# Patient Record
Sex: FEMALE | Race: WHITE | NOT HISPANIC OR LATINO | Employment: FULL TIME | URBAN - METROPOLITAN AREA
[De-identification: names, ages, dates, MRNs, and addresses within clinical notes are randomized per-mention and may not be internally consistent; named-entity substitution may affect disease eponyms.]

---

## 2024-01-18 ENCOUNTER — TELEPHONE (OUTPATIENT)
Dept: PRIMARY CARE | Facility: CLINIC | Age: 64
End: 2024-01-18

## 2024-01-18 NOTE — TELEPHONE ENCOUNTER
Received call from [employer] stating they faxed an order in October and November for patients CPAP supplies. Unable to locate this fax/paperwork. Advised caller to refax paperwork. Awaiting paperwork to be resent.

## 2024-02-07 NOTE — TELEPHONE ENCOUNTER
Spoke with staff member from [employer] [facility]. Requested a blank form be faxed to our office. Awaiting fax

## 2024-02-16 ENCOUNTER — LAB (OUTPATIENT)
Dept: LAB | Facility: LAB | Age: 64
End: 2024-02-16
Payer: COMMERCIAL

## 2024-02-16 DIAGNOSIS — E55.9 VITAMIN D DEFICIENCY, UNSPECIFIED: ICD-10-CM

## 2024-02-16 DIAGNOSIS — E11.9 TYPE 2 DIABETES MELLITUS WITHOUT COMPLICATIONS (MULTI): Primary | ICD-10-CM

## 2024-02-16 DIAGNOSIS — E78.5 HYPERLIPIDEMIA, UNSPECIFIED: ICD-10-CM

## 2024-02-16 LAB
25(OH)D3 SERPL-MCNC: 34 NG/ML (ref 31–100)
ALBUMIN SERPL-MCNC: 4.2 G/DL (ref 3.5–5)
ALP BLD-CCNC: 60 U/L (ref 35–125)
ALT SERPL-CCNC: 17 U/L (ref 5–40)
ANION GAP SERPL CALC-SCNC: 16 MMOL/L
AST SERPL-CCNC: 17 U/L (ref 5–40)
BILIRUB SERPL-MCNC: 0.4 MG/DL (ref 0.1–1.2)
BUN SERPL-MCNC: 18 MG/DL (ref 8–25)
CALCIUM SERPL-MCNC: 9.2 MG/DL (ref 8.5–10.4)
CHLORIDE SERPL-SCNC: 104 MMOL/L (ref 97–107)
CHOLEST SERPL-MCNC: 207 MG/DL (ref 133–200)
CHOLEST/HDLC SERPL: 3.4 {RATIO}
CO2 SERPL-SCNC: 24 MMOL/L (ref 24–31)
CREAT SERPL-MCNC: 0.6 MG/DL (ref 0.4–1.6)
CREAT UR-MCNC: 163.2 MG/DL
EGFRCR SERPLBLD CKD-EPI 2021: >90 ML/MIN/1.73M*2
EST. AVERAGE GLUCOSE BLD GHB EST-MCNC: 131 MG/DL
GLUCOSE SERPL-MCNC: 122 MG/DL (ref 65–99)
HBA1C MFR BLD: 6.2 %
HDLC SERPL-MCNC: 61 MG/DL
LDLC SERPL CALC-MCNC: 132 MG/DL (ref 65–130)
MICROALBUMIN UR-MCNC: <12 MG/L (ref 0–23)
MICROALBUMIN/CREAT UR: NORMAL MG/G{CREAT}
POTASSIUM SERPL-SCNC: 4.3 MMOL/L (ref 3.4–5.1)
PROT SERPL-MCNC: 7.1 G/DL (ref 5.9–7.9)
SODIUM SERPL-SCNC: 144 MMOL/L (ref 133–145)
TRIGL SERPL-MCNC: 70 MG/DL (ref 40–150)
VIT B12 SERPL-MCNC: 271 PG/ML (ref 211–946)

## 2024-02-16 PROCEDURE — 82043 UR ALBUMIN QUANTITATIVE: CPT

## 2024-02-16 PROCEDURE — 82607 VITAMIN B-12: CPT

## 2024-02-16 PROCEDURE — 36415 COLL VENOUS BLD VENIPUNCTURE: CPT

## 2024-02-16 PROCEDURE — 80053 COMPREHEN METABOLIC PANEL: CPT

## 2024-02-16 PROCEDURE — 80061 LIPID PANEL: CPT

## 2024-02-16 PROCEDURE — 82570 ASSAY OF URINE CREATININE: CPT

## 2024-02-16 PROCEDURE — 82306 VITAMIN D 25 HYDROXY: CPT

## 2024-02-16 PROCEDURE — 83036 HEMOGLOBIN GLYCOSYLATED A1C: CPT

## 2024-03-25 ENCOUNTER — OFFICE VISIT (OUTPATIENT)
Dept: PRIMARY CARE | Facility: CLINIC | Age: 64
End: 2024-03-25
Payer: COMMERCIAL

## 2024-03-25 VITALS
SYSTOLIC BLOOD PRESSURE: 134 MMHG | OXYGEN SATURATION: 96 % | HEIGHT: 63 IN | HEART RATE: 70 BPM | BODY MASS INDEX: 45.93 KG/M2 | TEMPERATURE: 97.3 F | WEIGHT: 259.2 LBS | DIASTOLIC BLOOD PRESSURE: 82 MMHG

## 2024-03-25 DIAGNOSIS — Z00.00 ANNUAL PHYSICAL EXAM: Primary | ICD-10-CM

## 2024-03-25 DIAGNOSIS — E11.9 CONTROLLED TYPE 2 DIABETES MELLITUS WITHOUT COMPLICATION, WITHOUT LONG-TERM CURRENT USE OF INSULIN (MULTI): ICD-10-CM

## 2024-03-25 DIAGNOSIS — E78.5 HYPERLIPIDEMIA, UNSPECIFIED HYPERLIPIDEMIA TYPE: ICD-10-CM

## 2024-03-25 DIAGNOSIS — I10 BENIGN HYPERTENSION: ICD-10-CM

## 2024-03-25 PROBLEM — Z90.710 HISTORY OF HYSTERECTOMY: Status: ACTIVE | Noted: 2024-03-25

## 2024-03-25 PROBLEM — K50.10: Status: ACTIVE | Noted: 2024-03-25

## 2024-03-25 PROBLEM — G47.33 OBSTRUCTIVE SLEEP APNEA: Status: ACTIVE | Noted: 2024-03-25

## 2024-03-25 PROBLEM — E55.9 VITAMIN D DEFICIENCY: Status: ACTIVE | Noted: 2024-03-25

## 2024-03-25 PROBLEM — Z85.42 HISTORY OF ENDOMETRIAL CANCER: Status: ACTIVE | Noted: 2024-03-25

## 2024-03-25 PROCEDURE — 1160F RVW MEDS BY RX/DR IN RCRD: CPT | Performed by: FAMILY MEDICINE

## 2024-03-25 PROCEDURE — 3050F LDL-C >= 130 MG/DL: CPT | Performed by: FAMILY MEDICINE

## 2024-03-25 PROCEDURE — 3044F HG A1C LEVEL LT 7.0%: CPT | Performed by: FAMILY MEDICINE

## 2024-03-25 PROCEDURE — 1159F MED LIST DOCD IN RCRD: CPT | Performed by: FAMILY MEDICINE

## 2024-03-25 PROCEDURE — 1036F TOBACCO NON-USER: CPT | Performed by: FAMILY MEDICINE

## 2024-03-25 PROCEDURE — 1158F ADVNC CARE PLAN TLK DOCD: CPT | Performed by: FAMILY MEDICINE

## 2024-03-25 PROCEDURE — 99397 PER PM REEVAL EST PAT 65+ YR: CPT | Performed by: FAMILY MEDICINE

## 2024-03-25 PROCEDURE — 3008F BODY MASS INDEX DOCD: CPT | Performed by: FAMILY MEDICINE

## 2024-03-25 PROCEDURE — 3079F DIAST BP 80-89 MM HG: CPT | Performed by: FAMILY MEDICINE

## 2024-03-25 PROCEDURE — 1123F ACP DISCUSS/DSCN MKR DOCD: CPT | Performed by: FAMILY MEDICINE

## 2024-03-25 PROCEDURE — 3062F POS MACROALBUMINURIA REV: CPT | Performed by: FAMILY MEDICINE

## 2024-03-25 PROCEDURE — 90677 PCV20 VACCINE IM: CPT | Performed by: FAMILY MEDICINE

## 2024-03-25 PROCEDURE — 3075F SYST BP GE 130 - 139MM HG: CPT | Performed by: FAMILY MEDICINE

## 2024-03-25 PROCEDURE — 1126F AMNT PAIN NOTED NONE PRSNT: CPT | Performed by: FAMILY MEDICINE

## 2024-03-25 RX ORDER — LISINOPRIL AND HYDROCHLOROTHIAZIDE 20; 25 MG/1; MG/1
1 TABLET ORAL EVERY 24 HOURS
COMMUNITY
Start: 2020-08-03 | End: 2024-04-02

## 2024-03-25 RX ORDER — ATORVASTATIN CALCIUM 20 MG/1
20 TABLET, FILM COATED ORAL DAILY
Qty: 90 TABLET | Refills: 3 | Status: SHIPPED | OUTPATIENT
Start: 2024-03-25 | End: 2025-03-25

## 2024-03-25 RX ORDER — CHOLECALCIFEROL (VITAMIN D3) 50 MCG
2000 TABLET ORAL EVERY 24 HOURS
COMMUNITY

## 2024-03-25 ASSESSMENT — PATIENT HEALTH QUESTIONNAIRE - PHQ9
SUM OF ALL RESPONSES TO PHQ9 QUESTIONS 1 AND 2: 0
2. FEELING DOWN, DEPRESSED OR HOPELESS: NOT AT ALL
1. LITTLE INTEREST OR PLEASURE IN DOING THINGS: NOT AT ALL

## 2024-03-25 ASSESSMENT — PAIN SCALES - GENERAL: PAINLEVEL: 0-NO PAIN

## 2024-03-25 NOTE — PROGRESS NOTES
History Of Present Illness  Sara Granado is a 63 y.o. female presenting for Annual Exam  .    HPI Last visit 9/13/2023 for diabetic follow-up.  Her A1c was 5.9.  Last colonoscopy 2017 with Dr. Vargas; then did cologuard in 3/2022 which is negative until 2025.  She is status post [facility] with BSO for uterine cancer in 2014, follows with Dr. Ho, who she intends to schedule with.   Diet controlled diabetes stable. She did not like side effects of metformin. Weight increased since last visit. No exercise. Diet unchanged.    Past Medical History  Patient Active Problem List    Diagnosis Date Noted    Benign hypertension 03/25/2024    Crohn's disease of large intestine (CMS/HCC) 03/25/2024    History of endometrial cancer 03/25/2024    History of hysterectomy 03/25/2024    Hyperlipidemia 03/25/2024    Obstructive sleep apnea 03/25/2024    Vitamin D deficiency 03/25/2024    Controlled type 2 diabetes mellitus without complication, without long-term current use of insulin (CMS/[facility]) 03/25/2024        Medications  Current Outpatient Medications on File Prior to Visit   Medication Sig    cholecalciferol (Vitamin D3) 50 MCG (2000 UT) tablet 1 tablet (2,000 Units) once every 24 hours.    lisinopriL-hydrochlorothiazide 20-25 mg tablet 1 tablet once every 24 hours.    NON FORMULARY once every 24 hours. OCCUVITE     No current facility-administered medications on file prior to visit.        Surgical History  She has a past surgical history that includes Hysterectomy (08/13/2014); Other surgical history (08/13/2014); Colectomy (08/13/2014); and BI stereotactic guided breast right localization and biopsy (Right, 12/17/2018).     Social History  She reports that she has never smoked. She has never used smokeless tobacco. She reports current alcohol use. She reports that she does not use drugs.    Family History  No family history on file.     Allergies  Codeine, Other, and Peach (prunus persica)    Immunizations  Immunization  "History   Administered Date(s) Administered    Moderna SARS-CoV-2 Vaccination 03/17/2021, 04/14/2021, 12/20/2021    Pneumococcal conjugate vaccine, 20-valent (PREVNAR 20) 03/25/2024    Tdap vaccine, age 7 year and older (BOOSTRIX, ADACEL) 06/18/2014    Zoster vaccine, recombinant, adult (SHINGRIX) 04/22/2022, 07/08/2022        ROS  Negative, except as discussed in HPI     Vitals  /82   Pulse 70   Temp 36.3 °C (97.3 °F)   Ht 1.588 m (5' 2.5\")   Wt 118 kg (259 lb 3.2 oz)   SpO2 96%   BMI 46.65 kg/m²      Physical Exam  Vitals and nursing note reviewed.   Constitutional:       Appearance: Normal appearance.   HENT:      Head: Normocephalic.      Right Ear: Tympanic membrane normal.      Left Ear: Tympanic membrane normal.      Nose: Nose normal.      Mouth/Throat:      Mouth: Mucous membranes are moist.   Eyes:      Extraocular Movements: Extraocular movements intact.      Conjunctiva/sclera: Conjunctivae normal.      Pupils: Pupils are equal, round, and reactive to light.   Cardiovascular:      Rate and Rhythm: Normal rate and regular rhythm.      Heart sounds: Normal heart sounds.   Pulmonary:      Effort: Pulmonary effort is normal. No respiratory distress.      Breath sounds: Normal breath sounds.   Abdominal:      General: Abdomen is flat.      Palpations: Abdomen is soft.      Tenderness: There is no abdominal tenderness.   Musculoskeletal:      Cervical back: Neck supple.   Lymphadenopathy:      Cervical: No cervical adenopathy.   Skin:     General: Skin is warm and dry.      Findings: No rash.   Neurological:      General: No focal deficit present.      Mental Status: She is alert. Mental status is at baseline.      Coordination: Coordination normal.      Gait: Gait normal.      Deep Tendon Reflexes: Reflexes normal.   Psychiatric:         Mood and Affect: Mood normal.         Behavior: Behavior normal.         Relevant Results  Lab Results   Component Value Date    WBC 6.5 02/04/2021    WBC 7.3 " 01/23/2020    HGB 13.2 02/04/2021    HGB 13.1 01/23/2020    HCT 41.8 02/04/2021    HCT 40.4 01/23/2020    MCV 95.9 02/04/2021    MCV 95.7 01/23/2020     02/04/2021     01/23/2020     Lab Results   Component Value Date     02/16/2024     03/03/2023    K 4.3 02/16/2024    K 4.4 03/03/2023     02/16/2024     03/03/2023    CO2 24 02/16/2024    CO2 24 03/03/2023    BUN 18 02/16/2024    BUN 18 03/03/2023    CREATININE 0.60 02/16/2024    CREATININE 0.6 03/03/2023    CALCIUM 9.2 02/16/2024    CALCIUM 9.4 03/03/2023    PROT 7.1 02/16/2024    PROT 7.2 03/03/2023    BILITOT 0.4 02/16/2024    BILITOT 0.4 03/03/2023    ALKPHOS 60 02/16/2024    ALKPHOS 59 03/03/2023    ALT 17 02/16/2024    ALT 18 03/03/2023    AST 17 02/16/2024    AST 17 03/03/2023    GLUCOSE 122 (H) 02/16/2024    GLUCOSE 127 (H) 03/03/2023     Lab Results   Component Value Date    HGBA1C 6.2 (H) 02/16/2024     Lab Results   Component Value Date    TSH 2.86 01/17/2018      Lab Results   Component Value Date    CHOL 207 (H) 02/16/2024    TRIG 70 02/16/2024    HDL 61.0 02/16/2024           Assessment/Plan   Sara was seen today for annual exam.  Diagnoses and all orders for this visit:  Annual physical exam (Primary)  Comments:  Will see OBGYN. UTD with  Benign hypertension  Comments:  controlled  Hyperlipidemia, unspecified hyperlipidemia type  Comments:  ASCVD 13.6%. Discussed statin and pt desires to start  Orders:  -     atorvastatin (Lipitor) 20 mg tablet; Take 1 tablet (20 mg) by mouth once daily.  Controlled type 2 diabetes mellitus without complication, without long-term current use of insulin (CMS/[facility])  Comments:  a1c 6.2%, diet controlled off metformin  Orders:  -     Follow Up In Primary Care - Established; Future  BMI 45.0-49.9, adult (CMS/[facility])  Comments:  discussed increasing exercise, dietary change for weight loss, and medications including GLP1 inhh  Other orders  -     Pneumococcal conjugate vaccine, 20-valent  (PREVNAR 20)       There are no discontinued medications.     Counseling:   Medication education:   -Education:  The patient is counseled regarding potential side-effects of any and all new medications  -Understanding:  Patient expressed understanding of information discussed today  -Adherence:  No barriers to adherence identified    Final treatment plan is a result of shared decision making with patient.         Navjot Gannon MD

## 2024-04-01 DIAGNOSIS — Z76.0 MEDICATION REFILL: ICD-10-CM

## 2024-04-02 RX ORDER — LISINOPRIL AND HYDROCHLOROTHIAZIDE 20; 25 MG/1; MG/1
1 TABLET ORAL DAILY
Qty: 90 TABLET | Refills: 3 | Status: SHIPPED | OUTPATIENT
Start: 2024-04-02

## 2024-09-24 ENCOUNTER — OFFICE VISIT (OUTPATIENT)
Dept: PRIMARY CARE | Facility: CLINIC | Age: 64
End: 2024-09-24
Payer: COMMERCIAL

## 2024-09-24 VITALS
DIASTOLIC BLOOD PRESSURE: 80 MMHG | BODY MASS INDEX: 45 KG/M2 | OXYGEN SATURATION: 96 % | TEMPERATURE: 96.9 F | HEART RATE: 78 BPM | HEIGHT: 63 IN | SYSTOLIC BLOOD PRESSURE: 118 MMHG | WEIGHT: 254 LBS

## 2024-09-24 DIAGNOSIS — N95.9 MENOPAUSAL DISORDER: ICD-10-CM

## 2024-09-24 DIAGNOSIS — E78.5 HYPERLIPIDEMIA, UNSPECIFIED HYPERLIPIDEMIA TYPE: ICD-10-CM

## 2024-09-24 DIAGNOSIS — E11.9 CONTROLLED TYPE 2 DIABETES MELLITUS WITHOUT COMPLICATION, WITHOUT LONG-TERM CURRENT USE OF INSULIN (MULTI): Primary | ICD-10-CM

## 2024-09-24 DIAGNOSIS — K50.118 CROHN'S DISEASE OF LARGE INTESTINE WITH OTHER COMPLICATION: ICD-10-CM

## 2024-09-24 DIAGNOSIS — E55.9 VITAMIN D DEFICIENCY: ICD-10-CM

## 2024-09-24 DIAGNOSIS — I10 BENIGN HYPERTENSION: ICD-10-CM

## 2024-09-24 LAB — POC HEMOGLOBIN A1C: 5.9 % (ref 4.2–6.5)

## 2024-09-24 PROCEDURE — 3062F POS MACROALBUMINURIA REV: CPT | Performed by: FAMILY MEDICINE

## 2024-09-24 PROCEDURE — 3050F LDL-C >= 130 MG/DL: CPT | Performed by: FAMILY MEDICINE

## 2024-09-24 PROCEDURE — 1126F AMNT PAIN NOTED NONE PRSNT: CPT | Performed by: FAMILY MEDICINE

## 2024-09-24 PROCEDURE — 1123F ACP DISCUSS/DSCN MKR DOCD: CPT | Performed by: FAMILY MEDICINE

## 2024-09-24 PROCEDURE — 1158F ADVNC CARE PLAN TLK DOCD: CPT | Performed by: FAMILY MEDICINE

## 2024-09-24 PROCEDURE — 3008F BODY MASS INDEX DOCD: CPT | Performed by: FAMILY MEDICINE

## 2024-09-24 PROCEDURE — 3044F HG A1C LEVEL LT 7.0%: CPT | Performed by: FAMILY MEDICINE

## 2024-09-24 PROCEDURE — 83036 HEMOGLOBIN GLYCOSYLATED A1C: CPT | Performed by: FAMILY MEDICINE

## 2024-09-24 PROCEDURE — 3074F SYST BP LT 130 MM HG: CPT | Performed by: FAMILY MEDICINE

## 2024-09-24 PROCEDURE — 1160F RVW MEDS BY RX/DR IN RCRD: CPT | Performed by: FAMILY MEDICINE

## 2024-09-24 PROCEDURE — 99214 OFFICE O/P EST MOD 30 MIN: CPT | Performed by: FAMILY MEDICINE

## 2024-09-24 PROCEDURE — 1159F MED LIST DOCD IN RCRD: CPT | Performed by: FAMILY MEDICINE

## 2024-09-24 PROCEDURE — 3079F DIAST BP 80-89 MM HG: CPT | Performed by: FAMILY MEDICINE

## 2024-09-24 PROCEDURE — 1036F TOBACCO NON-USER: CPT | Performed by: FAMILY MEDICINE

## 2024-09-24 ASSESSMENT — PATIENT HEALTH QUESTIONNAIRE - PHQ9
2. FEELING DOWN, DEPRESSED OR HOPELESS: NOT AT ALL
1. LITTLE INTEREST OR PLEASURE IN DOING THINGS: NOT AT ALL
SUM OF ALL RESPONSES TO PHQ9 QUESTIONS 1 AND 2: 0

## 2024-09-24 ASSESSMENT — PAIN SCALES - GENERAL: PAINLEVEL: 0-NO PAIN

## 2024-09-24 ASSESSMENT — LIFESTYLE VARIABLES
HOW OFTEN DO YOU HAVE SIX OR MORE DRINKS ON ONE OCCASION: NEVER
SKIP TO QUESTIONS 9-10: 1
HOW OFTEN DO YOU HAVE A DRINK CONTAINING ALCOHOL: 2-4 TIMES A MONTH
AUDIT-C TOTAL SCORE: 2
HOW MANY STANDARD DRINKS CONTAINING ALCOHOL DO YOU HAVE ON A TYPICAL DAY: 1 OR 2

## 2024-09-24 NOTE — PROGRESS NOTES
History Of Present Illness  Sara Granado is a 64 y.o. female presenting for Diabetes  .    She is a type II diabetic, without insulin use.  Has been diet controlled.  Last A1c was 6.2% 6 months ago.  Today she is down 5 pounds from her last visit, and A1c 5.9%.    She has essential hypertension which is controlled with lisinopril /hydrochlorothiazide.  Asymptomatic.  No dizziness, headaches, chest pain.    Hyperlipidemia -doing well on the statin.  No myalgias.    She has a history of Crohn's but hasn't been on medications for years.  She started noticing diarrhea and occasionally abdominal cramping.  She thought it was metformin but she has been off metformin for a while and now is still having it so she will see GI again.  Last colonoscopy was 2017 with Dr. Vargas.         Past Medical History  Patient Active Problem List    Diagnosis Date Noted    Benign hypertension 03/25/2024    Crohn's disease of large intestine (Multi) 03/25/2024    History of endometrial cancer 03/25/2024    History of hysterectomy 03/25/2024    Hyperlipidemia 03/25/2024    Obstructive sleep apnea 03/25/2024    Vitamin D deficiency 03/25/2024    Controlled type 2 diabetes mellitus without complication, without long-term current use of insulin (Multi) 03/25/2024        Medications  Current Outpatient Medications   Medication Sig Dispense Refill    atorvastatin (Lipitor) 20 mg tablet Take 1 tablet (20 mg) by mouth once daily. 90 tablet 3    cholecalciferol (Vitamin D3) 50 MCG (2000 UT) tablet 1 tablet (2,000 Units) once every 24 hours.      lisinopriL-hydrochlorothiazide 20-25 mg tablet TAKE 1 TABLET BY MOUTH ONCE DAILY 90 tablet 3    NON FORMULARY once every 24 hours. OCCUVITE       No current facility-administered medications for this visit.        Surgical History  She has a past surgical history that includes Hysterectomy (08/13/2014); Other surgical history (08/13/2014); Colectomy (08/13/2014); and BI stereotactic guided breast right  "localization and biopsy (Right, 12/17/2018).     Social History  She reports that she has never smoked. She has never used smokeless tobacco. She reports current alcohol use. She reports that she does not use drugs.    Family History  No family history on file.     Allergies  Codeine, Other, and Peach (prunus persica)    Immunizations  Immunization History   Administered Date(s) Administered    Moderna SARS-CoV-2 Vaccination 03/17/2021, 04/14/2021, 12/20/2021    Pneumococcal conjugate vaccine, 20-valent (PREVNAR 20) 03/25/2024    Tdap vaccine, age 7 year and older (BOOSTRIX, ADACEL) 06/18/2014    Zoster vaccine, recombinant, adult (SHINGRIX) 04/22/2022, 07/08/2022        ROS  Negative, except as discussed in HPI     Vitals  /80   Pulse 78   Temp 36.1 °C (96.9 °F)   Ht 1.588 m (5' 2.5\")   Wt 115 kg (254 lb)   SpO2 96%   BMI 45.72 kg/m²      Physical Exam  Vitals and nursing note reviewed.   Constitutional:       General: She is not in acute distress.     Appearance: Normal appearance.   Cardiovascular:      Rate and Rhythm: Normal rate and regular rhythm.      Heart sounds: Normal heart sounds.   Pulmonary:      Effort: No respiratory distress.      Breath sounds: Normal breath sounds.   Neurological:      General: No focal deficit present.      Mental Status: She is alert. Mental status is at baseline.         Relevant Results  Lab Results   Component Value Date    WBC 6.5 02/04/2021    WBC 7.3 01/23/2020    HGB 13.2 02/04/2021    HGB 13.1 01/23/2020    HCT 41.8 02/04/2021    HCT 40.4 01/23/2020    MCV 95.9 02/04/2021    MCV 95.7 01/23/2020     02/04/2021     01/23/2020     Lab Results   Component Value Date     02/16/2024     03/03/2023    K 4.3 02/16/2024    K 4.4 03/03/2023     02/16/2024     03/03/2023    CO2 24 02/16/2024    CO2 24 03/03/2023    BUN 18 02/16/2024    BUN 18 03/03/2023    CREATININE 0.60 02/16/2024    CREATININE 0.6 03/03/2023    CALCIUM 9.2 " 02/16/2024    CALCIUM 9.4 03/03/2023    PROT 7.1 02/16/2024    PROT 7.2 03/03/2023    BILITOT 0.4 02/16/2024    BILITOT 0.4 03/03/2023    ALKPHOS 60 02/16/2024    ALKPHOS 59 03/03/2023    ALT 17 02/16/2024    ALT 18 03/03/2023    AST 17 02/16/2024    AST 17 03/03/2023    GLUCOSE 122 (H) 02/16/2024    GLUCOSE 127 (H) 03/03/2023     Lab Results   Component Value Date    HGBA1C 5.9 09/24/2024     Lab Results   Component Value Date    TSH 2.86 01/17/2018      Lab Results   Component Value Date    CHOL 207 (H) 02/16/2024    TRIG 70 02/16/2024    HDL 61.0 02/16/2024           Assessment/Plan   Sara was seen today for diabetes.  Diagnoses and all orders for this visit:  Controlled type 2 diabetes mellitus without complication, without long-term current use of insulin (Multi) (Primary)  Comments:  a1c 5.9%, diet controlled  Orders:  -     Follow Up In Primary Care - Established  -     POCT glycosylated hemoglobin (Hb A1C) manually resulted  -     Albumin-Creatinine Ratio, Urine Random; Future  -     Lipid Panel; Future  -     Comprehensive Metabolic Panel; Future  -     Hemoglobin A1C; Future  -     CBC; Future  -     TSH with reflex to Free T4 if abnormal; Future  Benign hypertension  Comments:  controlled  Hyperlipidemia, unspecified hyperlipidemia type  Comments:  Last ; on statin; monitor  Crohn's disease of large intestine with other complication (Multi)  Comments:  no tx; but starting to have symptoms; she will see GI  Vitamin D deficiency  -     Vitamin D 25-Hydroxy,Total (for eval of Vitamin D levels); Future  Menopausal disorder  -     XR DEXA bone density; Future     Follow up in 6 months for yearly physical, after getting labs done.    Counseling:   Medication education:   -Education:  The patient is counseled regarding potential side-effects of any and all new medications  -Understanding:  Patient expressed understanding of information discussed today  -Adherence:  No barriers to adherence  identified    Final treatment plan is a result of shared decision making with patient.         Navjot Gannon MD

## 2024-11-23 ASSESSMENT — ENCOUNTER SYMPTOMS
DYSURIA: 0
COLOR CHANGE: 0
DIZZINESS: 0
FATIGUE: 0
ACTIVITY CHANGE: 0
ABDOMINAL PAIN: 0
WEAKNESS: 0
UNEXPECTED WEIGHT CHANGE: 0
HEADACHES: 0
JOINT SWELLING: 0
DIFFICULTY URINATING: 0
SHORTNESS OF BREATH: 0
ABDOMINAL DISTENTION: 0
CHEST TIGHTNESS: 0
ADENOPATHY: 0

## 2024-11-23 NOTE — PROGRESS NOTES
"Annual-menopause  Subjective   Sara Granado is a 64 y.o. female who is here for a menopausal gyn exam.   Complaints: Occasional leakage of urine; wears daily protection; denies vag bleed or discharge; denies pelvic  pain, pressure, or persistent bloating.  PMHx: BMI 46.       ENDOMETRIAL CA (dxd per bx  WC;  VASHTI/BSO per gyn onco Dr. Diaz.)       HTN.     SURENDRA     AODM/type 2; QxyE3Z=3.2;  no meds yet.       Crohns colitis not currently active s/p Ilicolic resection at age 10        Macular degen; hard to see computer at work;  no longer able to drive.       Plantar fasciitis - Dr. [facility].       COVID 19 vaccine series 2021.  Surg Hx: tonsillectomy         ileocolic resection due to Crohn's disease         Appendectomy 1970        R BREAST BX - benign 2012        VASHTI /BSO for ca of uterus dr. Diaz stage 1a grade 2 2014        Toe nail removal         Colonoscopy neg 2017; Dr. Vargas; Cologuard 2024 neg        R breast bx/stereotactic  w Craig; benign   Father: , MI @ 78, DM II, SKIN cancer  Mother: , bladder and skin cancer, DM, htn.   \"LUKAS\". Employed,  for [employer].  Exercise: none. No Tobacco.  Alcohol: Socially  Last pap 2020-neg, Abn pap-- NONE.   Mamm 4/10/2023-neg  Pelvic US: 2013 THICKENED ENDO;  Dxd w endometrial cancer tx w vashti/bso .   Menarche 14.  Last Period 2012.   STDs none. not currently sexually active; updated .  Total preg  1.  FEMALE \"BRENNEN\", .   Review of Systems   Constitutional:  Negative for activity change, fatigue and unexpected weight change.   Respiratory:  Negative for chest tightness and shortness of breath.    Cardiovascular:  Negative for chest pain and leg swelling.   Gastrointestinal:  Negative for abdominal distention and abdominal pain.   Genitourinary:  Negative for difficulty urinating, dysuria, genital sores, pelvic pain, vaginal bleeding, vaginal " "discharge and vaginal pain.   Musculoskeletal:  Negative for gait problem and joint swelling.   Skin:  Negative for color change and rash.   Neurological:  Negative for dizziness, weakness and headaches.   Hematological:  Negative for adenopathy.   Objective Visit Vitals  /73   Ht 1.588 m (5' 2.5\")   Wt 116 kg (255 lb 12.8 oz)   BMI 46.04 kg/m²   OB Status Hysterectomy   Smoking Status Never   BSA 2.26 m²       General:   Alert and oriented, in no acute distress   Neck: Supple. No visible thyromegaly.    Breast/Axilla: Normal to palpation bilaterally without masses, skin changes, or nipple discharge. Rash intertriginous   Abdomen: Soft, non-tender, without masses or organomegaly   Vulva: Normal architecture; pos  erythema both majora from pad contact ;no masses, or lesions.    Vagina: mucosa without lesions, masses.  Positive atrophy. No abnormal vaginal discharge.    Cervix: Surgically absent   Uterus: Surgically absent   Adnexa: Surgically absent   Pelvic Floor No POP noted. No high tone pelvic floor    Psych  Rectal Normal affect. Normal mood.      Assessment/Plan   Encounter Diagnoses   Name Primary?    Visit for gynecologic examination; no suspicious findings on breast or pelvic exam Yes    Encounter for screening mammogram for malignant neoplasm of breast; order placed.     History of hysterectomy; no further routine paps     History of endometrial cancer; s/p hyst     Menopause; no vaso sxs/sleep disruption     Postmenopausal atrophic vaginitis; asx/abstinent          Screen for colon cancer; completed Cologuard 2023 neg     BMI 45.0-49.9, adult (Multi); encouraged healthy diet/more movement.       Rosangela Ho MD    "

## 2024-11-25 ENCOUNTER — OFFICE VISIT (OUTPATIENT)
Dept: OBSTETRICS AND GYNECOLOGY | Facility: CLINIC | Age: 64
End: 2024-11-25
Payer: COMMERCIAL

## 2024-11-25 VITALS
HEIGHT: 63 IN | WEIGHT: 255.8 LBS | DIASTOLIC BLOOD PRESSURE: 73 MMHG | BODY MASS INDEX: 45.32 KG/M2 | SYSTOLIC BLOOD PRESSURE: 147 MMHG

## 2024-11-25 DIAGNOSIS — Z12.31 ENCOUNTER FOR SCREENING MAMMOGRAM FOR MALIGNANT NEOPLASM OF BREAST: ICD-10-CM

## 2024-11-25 DIAGNOSIS — N95.2 POSTMENOPAUSAL ATROPHIC VAGINITIS: ICD-10-CM

## 2024-11-25 DIAGNOSIS — M81.0 POSTMENOPAUSAL BONE LOSS: ICD-10-CM

## 2024-11-25 DIAGNOSIS — Z12.11 SCREEN FOR COLON CANCER: ICD-10-CM

## 2024-11-25 DIAGNOSIS — Z90.710 HISTORY OF HYSTERECTOMY: ICD-10-CM

## 2024-11-25 DIAGNOSIS — Z01.419 VISIT FOR GYNECOLOGIC EXAMINATION: Primary | ICD-10-CM

## 2024-11-25 DIAGNOSIS — Z78.0 MENOPAUSE: ICD-10-CM

## 2024-11-25 DIAGNOSIS — Z85.42 HISTORY OF ENDOMETRIAL CANCER: ICD-10-CM

## 2024-11-25 PROCEDURE — 1159F MED LIST DOCD IN RCRD: CPT | Performed by: OBSTETRICS & GYNECOLOGY

## 2024-11-25 PROCEDURE — 99397 PER PM REEVAL EST PAT 65+ YR: CPT | Performed by: OBSTETRICS & GYNECOLOGY

## 2024-11-25 PROCEDURE — 3008F BODY MASS INDEX DOCD: CPT | Performed by: OBSTETRICS & GYNECOLOGY

## 2024-11-25 PROCEDURE — 3050F LDL-C >= 130 MG/DL: CPT | Performed by: OBSTETRICS & GYNECOLOGY

## 2024-11-25 PROCEDURE — 3062F POS MACROALBUMINURIA REV: CPT | Performed by: OBSTETRICS & GYNECOLOGY

## 2024-11-25 PROCEDURE — 3078F DIAST BP <80 MM HG: CPT | Performed by: OBSTETRICS & GYNECOLOGY

## 2024-11-25 PROCEDURE — 3077F SYST BP >= 140 MM HG: CPT | Performed by: OBSTETRICS & GYNECOLOGY

## 2024-11-25 PROCEDURE — 1160F RVW MEDS BY RX/DR IN RCRD: CPT | Performed by: OBSTETRICS & GYNECOLOGY

## 2024-11-25 PROCEDURE — 1126F AMNT PAIN NOTED NONE PRSNT: CPT | Performed by: OBSTETRICS & GYNECOLOGY

## 2024-11-25 PROCEDURE — 1123F ACP DISCUSS/DSCN MKR DOCD: CPT | Performed by: OBSTETRICS & GYNECOLOGY

## 2024-11-25 PROCEDURE — 3044F HG A1C LEVEL LT 7.0%: CPT | Performed by: OBSTETRICS & GYNECOLOGY

## 2024-11-25 PROCEDURE — 1036F TOBACCO NON-USER: CPT | Performed by: OBSTETRICS & GYNECOLOGY

## 2024-11-25 ASSESSMENT — ENCOUNTER SYMPTOMS
OCCASIONAL FEELINGS OF UNSTEADINESS: 0
LOSS OF SENSATION IN FEET: 0
DEPRESSION: 0

## 2024-11-25 ASSESSMENT — LIFESTYLE VARIABLES
HOW OFTEN DO YOU HAVE A DRINK CONTAINING ALCOHOL: MONTHLY OR LESS
AUDIT-C TOTAL SCORE: 3
SKIP TO QUESTIONS 9-10: 0
HOW OFTEN DO YOU HAVE SIX OR MORE DRINKS ON ONE OCCASION: MONTHLY
HOW MANY STANDARD DRINKS CONTAINING ALCOHOL DO YOU HAVE ON A TYPICAL DAY: 1 OR 2

## 2024-11-25 ASSESSMENT — PATIENT HEALTH QUESTIONNAIRE - PHQ9
SUM OF ALL RESPONSES TO PHQ9 QUESTIONS 1 & 2: 0
2. FEELING DOWN, DEPRESSED OR HOPELESS: NOT AT ALL
1. LITTLE INTEREST OR PLEASURE IN DOING THINGS: NOT AT ALL

## 2024-11-25 ASSESSMENT — PAIN SCALES - GENERAL: PAINLEVEL_OUTOF10: 0-NO PAIN

## 2024-11-29 ENCOUNTER — HOSPITAL ENCOUNTER (OUTPATIENT)
Dept: RADIOLOGY | Facility: HOSPITAL | Age: 64
Discharge: HOME | End: 2024-11-29
Payer: COMMERCIAL

## 2024-11-29 DIAGNOSIS — N95.9 MENOPAUSAL DISORDER: ICD-10-CM

## 2024-11-29 PROCEDURE — 77080 DXA BONE DENSITY AXIAL: CPT

## 2024-12-28 ENCOUNTER — HOSPITAL ENCOUNTER (OUTPATIENT)
Dept: RADIOLOGY | Facility: HOSPITAL | Age: 64
Discharge: HOME | End: 2024-12-28
Payer: COMMERCIAL

## 2024-12-28 VITALS — WEIGHT: 255 LBS | BODY MASS INDEX: 46.93 KG/M2 | HEIGHT: 62 IN

## 2024-12-28 DIAGNOSIS — Z12.31 ENCOUNTER FOR SCREENING MAMMOGRAM FOR MALIGNANT NEOPLASM OF BREAST: ICD-10-CM

## 2024-12-28 PROCEDURE — 77067 SCR MAMMO BI INCL CAD: CPT | Performed by: RADIOLOGY

## 2024-12-28 PROCEDURE — 77063 BREAST TOMOSYNTHESIS BI: CPT

## 2024-12-28 PROCEDURE — 77063 BREAST TOMOSYNTHESIS BI: CPT | Performed by: RADIOLOGY

## 2025-03-19 DIAGNOSIS — E78.5 HYPERLIPIDEMIA, UNSPECIFIED HYPERLIPIDEMIA TYPE: ICD-10-CM

## 2025-03-21 RX ORDER — ATORVASTATIN CALCIUM 20 MG/1
20 TABLET, FILM COATED ORAL DAILY
Qty: 90 TABLET | Refills: 3 | Status: SHIPPED | OUTPATIENT
Start: 2025-03-21 | End: 2026-03-21

## 2025-03-26 DIAGNOSIS — Z76.0 MEDICATION REFILL: ICD-10-CM

## 2025-03-26 NOTE — TELEPHONE ENCOUNTER
Pharmacy requesting refill on populated medication. Pt had a follow up on 9/24/2024, physical scheduled for 4/01.

## 2025-03-30 RX ORDER — LISINOPRIL AND HYDROCHLOROTHIAZIDE 20; 25 MG/1; MG/1
1 TABLET ORAL DAILY
Qty: 90 TABLET | Refills: 1 | Status: SHIPPED | OUTPATIENT
Start: 2025-03-30

## 2025-04-01 ENCOUNTER — OFFICE VISIT (OUTPATIENT)
Dept: PRIMARY CARE | Facility: CLINIC | Age: 65
End: 2025-04-01
Payer: COMMERCIAL

## 2025-04-01 VITALS
OXYGEN SATURATION: 97 % | HEIGHT: 62 IN | BODY MASS INDEX: 46.56 KG/M2 | SYSTOLIC BLOOD PRESSURE: 124 MMHG | DIASTOLIC BLOOD PRESSURE: 82 MMHG | WEIGHT: 253 LBS | TEMPERATURE: 97 F | HEART RATE: 97 BPM

## 2025-04-01 DIAGNOSIS — Z12.11 SCREENING FOR COLON CANCER: ICD-10-CM

## 2025-04-01 DIAGNOSIS — Z00.00 ANNUAL PHYSICAL EXAM: Primary | ICD-10-CM

## 2025-04-01 DIAGNOSIS — E11.9 CONTROLLED TYPE 2 DIABETES MELLITUS WITHOUT COMPLICATION, WITHOUT LONG-TERM CURRENT USE OF INSULIN: ICD-10-CM

## 2025-04-01 DIAGNOSIS — K50.118 CROHN'S DISEASE OF LARGE INTESTINE WITH OTHER COMPLICATION: ICD-10-CM

## 2025-04-01 PROCEDURE — 3079F DIAST BP 80-89 MM HG: CPT | Performed by: FAMILY MEDICINE

## 2025-04-01 PROCEDURE — 1036F TOBACCO NON-USER: CPT | Performed by: FAMILY MEDICINE

## 2025-04-01 PROCEDURE — 99397 PER PM REEVAL EST PAT 65+ YR: CPT | Performed by: FAMILY MEDICINE

## 2025-04-01 PROCEDURE — 1126F AMNT PAIN NOTED NONE PRSNT: CPT | Performed by: FAMILY MEDICINE

## 2025-04-01 PROCEDURE — 1160F RVW MEDS BY RX/DR IN RCRD: CPT | Performed by: FAMILY MEDICINE

## 2025-04-01 PROCEDURE — 3008F BODY MASS INDEX DOCD: CPT | Performed by: FAMILY MEDICINE

## 2025-04-01 PROCEDURE — 3074F SYST BP LT 130 MM HG: CPT | Performed by: FAMILY MEDICINE

## 2025-04-01 PROCEDURE — 1158F ADVNC CARE PLAN TLK DOCD: CPT | Performed by: FAMILY MEDICINE

## 2025-04-01 PROCEDURE — 1159F MED LIST DOCD IN RCRD: CPT | Performed by: FAMILY MEDICINE

## 2025-04-01 PROCEDURE — 1123F ACP DISCUSS/DSCN MKR DOCD: CPT | Performed by: FAMILY MEDICINE

## 2025-04-01 ASSESSMENT — LIFESTYLE VARIABLES
SKIP TO QUESTIONS 9-10: 1
HOW OFTEN DO YOU HAVE SIX OR MORE DRINKS ON ONE OCCASION: NEVER
HOW MANY STANDARD DRINKS CONTAINING ALCOHOL DO YOU HAVE ON A TYPICAL DAY: 1 OR 2
AUDIT-C TOTAL SCORE: 2
HOW OFTEN DO YOU HAVE A DRINK CONTAINING ALCOHOL: 2-4 TIMES A MONTH

## 2025-04-01 ASSESSMENT — PATIENT HEALTH QUESTIONNAIRE - PHQ9
2. FEELING DOWN, DEPRESSED OR HOPELESS: NOT AT ALL
SUM OF ALL RESPONSES TO PHQ9 QUESTIONS 1 AND 2: 0
1. LITTLE INTEREST OR PLEASURE IN DOING THINGS: NOT AT ALL

## 2025-04-01 ASSESSMENT — PAIN SCALES - GENERAL: PAINLEVEL_OUTOF10: 0-NO PAIN

## 2025-04-01 NOTE — PROGRESS NOTES
History Of Present Illness  Sara Granado is a 64 y.o. female presenting for Annual Exam  .    [facility] [place]     Health maintenance: She is status post hysterectomy.  Sees OB/GYN, is up-to-date with mammogram.  Bone density 11/2024 showed osteopenia.  She has labs ordered in the system, did not know to complete them today.  Vaccinations are up-to-date.    She has essential hypertension controlled.  She is diet controlledType 2 diabetes    She has Crohn's disease, diagnosed years ago.  Baseline is loose stools, no alarm symptoms.  Declines to see GI as she has been stable.    Past Medical History  Patient Active Problem List    Diagnosis Date Noted    Benign hypertension 03/25/2024    Crohn's disease of large intestine 03/25/2024    History of endometrial cancer 03/25/2024    History of hysterectomy 03/25/2024    Hyperlipidemia 03/25/2024    Obstructive sleep apnea 03/25/2024    Vitamin D deficiency 03/25/2024    Controlled type 2 diabetes mellitus without complication, without long-term current use of insulin 03/25/2024        Medications  Current Outpatient Medications   Medication Sig Dispense Refill    atorvastatin (Lipitor) 20 mg tablet Take 1 tablet (20 mg) by mouth once daily. 90 tablet 3    cholecalciferol (Vitamin D3) 50 MCG (2000 UT) tablet 1 tablet (2,000 Units) once every 24 hours.      lisinopriL-hydrochlorothiazide 20-25 mg tablet TAKE 1 TABLET BY MOUTH ONCE DAILY 90 tablet 1    NON FORMULARY once every 24 hours. OCCUVITE       No current facility-administered medications for this visit.        Surgical History  She has a past surgical history that includes Hysterectomy (08/13/2014); Other surgical history (08/13/2014); Colectomy (08/13/2014); and BI stereotactic guided breast right localization and biopsy (Right, 12/17/2018).     Social History  She reports that she has never smoked. She has never used smokeless tobacco. She reports current alcohol use. She reports that she does not use drugs.    Family  "History  Family History   Problem Relation Name Age of Onset    Other (htn) Mother      Diabetes Mother      Other (htn) Father      Diabetes Father          Allergies  Codeine, Other, and Peach (prunus persica)    Immunizations  Immunization History   Administered Date(s) Administered    Moderna SARS-CoV-2 Vaccination 03/17/2021, 04/14/2021, 12/20/2021    Pneumococcal conjugate vaccine, 20-valent (PREVNAR 20) 03/25/2024    Tdap vaccine, age 7 year and older (BOOSTRIX, ADACEL) 06/18/2014    Zoster vaccine, recombinant, adult (SHINGRIX) 04/22/2022, 07/08/2022        ROS  Negative, except as discussed in HPI     Vitals  /82   Pulse 97   Temp 36.1 °C (97 °F)   Ht 1.575 m (5' 2\")   Wt 115 kg (253 lb)   SpO2 97%   BMI 46.27 kg/m²      Physical Exam  Vitals and nursing note reviewed.   Constitutional:       Appearance: Normal appearance.   HENT:      Head: Normocephalic.      Right Ear: Tympanic membrane normal.      Left Ear: Tympanic membrane normal.      Nose: Nose normal.      Mouth/Throat:      Mouth: Mucous membranes are moist.   Eyes:      Extraocular Movements: Extraocular movements intact.      Conjunctiva/sclera: Conjunctivae normal.      Pupils: Pupils are equal, round, and reactive to light.   Cardiovascular:      Rate and Rhythm: Normal rate and regular rhythm.      Heart sounds: Normal heart sounds.   Pulmonary:      Effort: Pulmonary effort is normal. No respiratory distress.      Breath sounds: Normal breath sounds.   Abdominal:      General: Abdomen is flat.      Palpations: Abdomen is soft.      Tenderness: There is no abdominal tenderness.   Musculoskeletal:      Cervical back: Neck supple.   Lymphadenopathy:      Cervical: No cervical adenopathy.   Skin:     General: Skin is warm and dry.      Findings: No rash.   Neurological:      General: No focal deficit present.      Mental Status: She is alert. Mental status is at baseline.      Coordination: Coordination normal.      Gait: Gait " normal.      Deep Tendon Reflexes: Reflexes normal.   Psychiatric:         Mood and Affect: Mood normal.         Behavior: Behavior normal.         Relevant Results  Lab Results   Component Value Date    WBC 6.5 02/04/2021    WBC 7.3 01/23/2020    HGB 13.2 02/04/2021    HGB 13.1 01/23/2020    HCT 41.8 02/04/2021    HCT 40.4 01/23/2020    MCV 95.9 02/04/2021    MCV 95.7 01/23/2020     02/04/2021     01/23/2020     Lab Results   Component Value Date     02/16/2024     03/03/2023    K 4.3 02/16/2024    K 4.4 03/03/2023     02/16/2024     03/03/2023    CO2 24 02/16/2024    CO2 24 03/03/2023    BUN 18 02/16/2024    BUN 18 03/03/2023    CREATININE 0.60 02/16/2024    CREATININE 0.6 03/03/2023    CALCIUM 9.2 02/16/2024    CALCIUM 9.4 03/03/2023    PROT 7.1 02/16/2024    PROT 7.2 03/03/2023    BILITOT 0.4 02/16/2024    BILITOT 0.4 03/03/2023    ALKPHOS 60 02/16/2024    ALKPHOS 59 03/03/2023    ALT 17 02/16/2024    ALT 18 03/03/2023    AST 17 02/16/2024    AST 17 03/03/2023    GLUCOSE 122 (H) 02/16/2024    GLUCOSE 127 (H) 03/03/2023     Lab Results   Component Value Date    HGBA1C 5.9 09/24/2024     Lab Results   Component Value Date    TSH 2.86 01/17/2018      Lab Results   Component Value Date    CHOL 207 (H) 02/16/2024    TRIG 70 02/16/2024    HDL 61.0 02/16/2024           Assessment/Plan   Sara was seen today for annual exam.  Diagnoses and all orders for this visit:  Annual physical exam (Primary)  Comments:  lab orders still active  Crohn's disease of large intestine with other complication  Comments:  in remission  Screening for colon cancer  -     Cologuard® colon cancer screening  Controlled type 2 diabetes mellitus without complication, without long-term current use of insulin  Comments:  Get labs to check A1c         Counseling:   Medication education:   -Education:  The patient is counseled regarding potential side-effects of any and all new medications  -Understanding:   Patient expressed understanding of information discussed today  -Adherence:  No barriers to adherence identified    Final treatment plan is a result of shared decision making with patient.         Navjot Gannon MD

## 2025-04-19 LAB
25(OH)D3+25(OH)D2 SERPL-MCNC: 56 NG/ML (ref 30–100)
ALBUMIN SERPL-MCNC: 4.4 G/DL (ref 3.6–5.1)
ALP SERPL-CCNC: 65 U/L (ref 37–153)
ALT SERPL-CCNC: 21 U/L (ref 6–29)
ANION GAP SERPL CALCULATED.4IONS-SCNC: 14 MMOL/L (CALC) (ref 7–17)
AST SERPL-CCNC: 21 U/L (ref 10–35)
BILIRUB SERPL-MCNC: 0.6 MG/DL (ref 0.2–1.2)
BUN SERPL-MCNC: 19 MG/DL (ref 7–25)
CALCIUM SERPL-MCNC: 9.2 MG/DL (ref 8.6–10.4)
CHLORIDE SERPL-SCNC: 103 MMOL/L (ref 98–110)
CHOLEST SERPL-MCNC: 148 MG/DL
CHOLEST/HDLC SERPL: 2.5 (CALC)
CO2 SERPL-SCNC: 24 MMOL/L (ref 20–32)
CREAT SERPL-MCNC: 0.64 MG/DL (ref 0.5–1.05)
EGFRCR SERPLBLD CKD-EPI 2021: 97 ML/MIN/1.73M2
ERYTHROCYTE [DISTWIDTH] IN BLOOD BY AUTOMATED COUNT: 13 % (ref 11–15)
EST. AVERAGE GLUCOSE BLD GHB EST-MCNC: 137 MG/DL
EST. AVERAGE GLUCOSE BLD GHB EST-SCNC: 7.6 MMOL/L
GLUCOSE SERPL-MCNC: 117 MG/DL (ref 65–99)
HBA1C MFR BLD: 6.4 %
HCT VFR BLD AUTO: 37.4 % (ref 35–45)
HDLC SERPL-MCNC: 60 MG/DL
HGB BLD-MCNC: 12.4 G/DL (ref 11.7–15.5)
LDLC SERPL CALC-MCNC: 73 MG/DL (CALC)
MCH RBC QN AUTO: 31.2 PG (ref 27–33)
MCHC RBC AUTO-ENTMCNC: 33.2 G/DL (ref 32–36)
MCV RBC AUTO: 94.2 FL (ref 80–100)
NONHDLC SERPL-MCNC: 88 MG/DL (CALC)
PLATELET # BLD AUTO: 223 THOUSAND/UL (ref 140–400)
PMV BLD REES-ECKER: 10 FL (ref 7.5–12.5)
POTASSIUM SERPL-SCNC: 4.6 MMOL/L (ref 3.5–5.3)
PROT SERPL-MCNC: 7.4 G/DL (ref 6.1–8.1)
RBC # BLD AUTO: 3.97 MILLION/UL (ref 3.8–5.1)
SODIUM SERPL-SCNC: 141 MMOL/L (ref 135–146)
TRIGL SERPL-MCNC: 74 MG/DL
TSH SERPL-ACNC: 2.21 MIU/L (ref 0.4–4.5)
WBC # BLD AUTO: 6.5 THOUSAND/UL (ref 3.8–10.8)

## 2025-04-23 LAB — NONINV COLON CA DNA+OCC BLD SCRN STL QL: NEGATIVE
